# Patient Record
Sex: MALE | Race: ASIAN | NOT HISPANIC OR LATINO | URBAN - METROPOLITAN AREA
[De-identification: names, ages, dates, MRNs, and addresses within clinical notes are randomized per-mention and may not be internally consistent; named-entity substitution may affect disease eponyms.]

---

## 2019-09-09 ENCOUNTER — EMERGENCY (EMERGENCY)
Facility: HOSPITAL | Age: 34
LOS: 1 days | Discharge: HOME | End: 2019-09-09
Attending: EMERGENCY MEDICINE | Admitting: EMERGENCY MEDICINE
Payer: COMMERCIAL

## 2019-09-09 VITALS
SYSTOLIC BLOOD PRESSURE: 172 MMHG | TEMPERATURE: 98 F | HEART RATE: 63 BPM | OXYGEN SATURATION: 98 % | DIASTOLIC BLOOD PRESSURE: 103 MMHG | RESPIRATION RATE: 18 BRPM

## 2019-09-09 VITALS
TEMPERATURE: 98 F | WEIGHT: 154.98 LBS | DIASTOLIC BLOOD PRESSURE: 106 MMHG | OXYGEN SATURATION: 98 % | HEART RATE: 69 BPM | SYSTOLIC BLOOD PRESSURE: 166 MMHG | RESPIRATION RATE: 16 BRPM
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
## 2019-09-09 DIAGNOSIS — M79.632 PAIN IN LEFT FOREARM: ICD-10-CM

## 2019-09-09 DIAGNOSIS — Z88.0 ALLERGY STATUS TO PENICILLIN: ICD-10-CM
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
## 2019-09-09 DIAGNOSIS — S50.12XA CONTUSION OF LEFT FOREARM, INITIAL ENCOUNTER: ICD-10-CM

## 2019-09-09 PROCEDURE — 99283 EMERGENCY DEPT VISIT LOW MDM: CPT

## 2019-09-09 RX ORDER — OXYCODONE AND ACETAMINOPHEN 5; 325 MG/1; MG/1
1 TABLET ORAL EVERY 6 HOURS
Refills: 0 | Status: DISCONTINUED | OUTPATIENT
Start: 2019-09-09 | End: 2019-09-09

## 2019-09-09 RX ORDER — IBUPROFEN 200 MG
1 TABLET ORAL
Qty: 28 | Refills: 0
Start: 2019-09-09 | End: 2019-09-15

## 2019-09-09 RX ADMIN — OXYCODONE AND ACETAMINOPHEN 1 TABLET(S): 5; 325 TABLET ORAL at 21:58

## 2019-09-09 NOTE — ED PROVIDER NOTE - PATIENT PORTAL LINK FT
You can access the FollowMyHealth Patient Portal offered by [facility] by registering at the following website: [email or web address]. By joining [facility]’s FollowMyHealth portal, you will also be able to view your health information using other applications (apps) compatible with our system.

## 2019-09-09 NOTE — ED ADULT NURSE NOTE - OBJECTIVE STATEMENT
Pt complaining of left forearm injury yesterday after slipping on ice yesterday. swelling noted to area.

## 2019-09-09 NOTE — ED PROVIDER NOTE - CLINICAL SUMMARY MEDICAL DECISION MAKING FREE TEXT BOX
xrays on cd reviewed, no fracture appreciated, currently has no symptoms of compartment, or dvt. findings discussed with patient, whom prefers to be discharged, and will return for sono should any symptoms worsen. appears nontoxic. with stable vs.

## 2019-09-09 NOTE — ED PROVIDER NOTE - OBJECTIVE STATEMENT
34 yo M, previously well, presents with bruising and swelling to L forearm, s/p fall 24 hours ago. denies numbness, paresthesias or weakness to L forearm. patient went to [facility] prior to arrival and advised to come to ED for sono. patient has xrays on cd of L elbow and forearm which are negative. R hand dominant. denies other injury. denies bleeding disorder or anticoagulants.

## 2019-09-09 NOTE — ED ADULT TRIAGE NOTE - CHIEF COMPLAINT QUOTE
Pt sent from  for eval of Lt forearm injury s/p fall yesterday. Lt forearm swelling and redness noted. Pulses present and equal. Temperature and color WNL in hands bilaterally.

## 2019-09-09 NOTE — ED PROVIDER NOTE - PROGRESS NOTE DETAILS
images from [facility] on cd, viewed, by me, wrist and forearm with no acute fracture. at this time, patient has no clinical signs of compartment symptoms. applied ace, advised ice and nsaids. return for any worsening swelling or pain, given strict return precautions. do not suspect dvt, patient offered sono, but does not want to wait for sono, advised strict return for any worsening swelling.

## 2019-09-09 NOTE — ED PROVIDER NOTE - UPPER EXTREMITY EXAM, LEFT
TENDERNESS/contusion/hematoma to dorsal L forearm, with no palpable crepitus, fluctuance or induration, ovelrying scant abrasion healing well with no streaking or increased warmth. distal radial pulse wnl, distal median/radial/ulnar nn intact to motor and sensory, capillary refill <2 seconds./SWELLING/BRUISING

## 2021-06-02 ENCOUNTER — HOSPITAL ENCOUNTER (EMERGENCY)
Dept: HOSPITAL 8 - ED | Age: 36
Discharge: HOME | End: 2021-06-02
Payer: COMMERCIAL

## 2021-06-02 VITALS — BODY MASS INDEX: 28.34 KG/M2 | HEIGHT: 64 IN | WEIGHT: 166.01 LBS

## 2021-06-02 VITALS — DIASTOLIC BLOOD PRESSURE: 78 MMHG | SYSTOLIC BLOOD PRESSURE: 119 MMHG
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
## 2021-06-02 DIAGNOSIS — S01.91XA: Primary | ICD-10-CM
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
## 2021-06-02 PROCEDURE — 12002 RPR S/N/AX/GEN/TRNK2.6-7.5CM: CPT

## 2021-06-02 PROCEDURE — 90715 TDAP VACCINE 7 YRS/> IM: CPT

## 2021-06-02 PROCEDURE — 90471 IMMUNIZATION ADMIN: CPT

## 2021-06-02 PROCEDURE — 70450 CT HEAD/BRAIN W/O DYE: CPT

## 2021-06-02 PROCEDURE — 99284 EMERGENCY DEPT VISIT MOD MDM: CPT

## 2021-06-02 NOTE — NUR
Patient is resting comfortably in bed. Bed in lowest, rails engaged, call light 
on lap.

CONNECTED TO MONITORS. Vital Signs within normal limits. WCTM.

## 2021-06-02 NOTE — NUR
Patient/Caregiver given discharge instructions and they have confirmed that 
they understand the instructions.  Patient ambulatory with steady gait. NAD, 
all questions answered appropriately, denies additional needs at this time. No 
personal belongings left in room after discharge.
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
